# Patient Record
Sex: MALE | HISPANIC OR LATINO | Employment: UNEMPLOYED | URBAN - METROPOLITAN AREA
[De-identification: names, ages, dates, MRNs, and addresses within clinical notes are randomized per-mention and may not be internally consistent; named-entity substitution may affect disease eponyms.]

---

## 2024-06-16 ENCOUNTER — APPOINTMENT (OUTPATIENT)
Dept: RADIOLOGY | Facility: MEDICAL CENTER | Age: 69
End: 2024-06-16
Attending: EMERGENCY MEDICINE
Payer: MEDICARE

## 2024-06-16 ENCOUNTER — HOSPITAL ENCOUNTER (EMERGENCY)
Facility: MEDICAL CENTER | Age: 69
End: 2024-06-16
Attending: EMERGENCY MEDICINE
Payer: MEDICARE

## 2024-06-16 VITALS
HEART RATE: 86 BPM | WEIGHT: 171.08 LBS | BODY MASS INDEX: 30.31 KG/M2 | SYSTOLIC BLOOD PRESSURE: 138 MMHG | DIASTOLIC BLOOD PRESSURE: 65 MMHG | HEIGHT: 63 IN | TEMPERATURE: 98.2 F | OXYGEN SATURATION: 97 % | RESPIRATION RATE: 16 BRPM

## 2024-06-16 DIAGNOSIS — R32 URINARY INCONTINENCE, UNSPECIFIED TYPE: ICD-10-CM

## 2024-06-16 DIAGNOSIS — R00.0 SINUS TACHYCARDIA: ICD-10-CM

## 2024-06-16 DIAGNOSIS — R82.4 KETONURIA: ICD-10-CM

## 2024-06-16 DIAGNOSIS — N39.0 ACUTE UTI: Primary | ICD-10-CM

## 2024-06-16 DIAGNOSIS — E86.0 DEHYDRATION: ICD-10-CM

## 2024-06-16 DIAGNOSIS — R50.9 LOW GRADE FEVER: ICD-10-CM

## 2024-06-16 DIAGNOSIS — R30.0 DYSURIA: ICD-10-CM

## 2024-06-16 LAB
ALBUMIN SERPL BCP-MCNC: 4.3 G/DL (ref 3.2–4.9)
ALBUMIN/GLOB SERPL: 1.2 G/DL
ALP SERPL-CCNC: 115 U/L (ref 30–99)
ALT SERPL-CCNC: 42 U/L (ref 2–50)
AMORPH CRY #/AREA URNS HPF: PRESENT /HPF
ANION GAP SERPL CALC-SCNC: 15 MMOL/L (ref 7–16)
APPEARANCE UR: CLEAR
AST SERPL-CCNC: 55 U/L (ref 12–45)
BACTERIA #/AREA URNS HPF: ABNORMAL /HPF
BASOPHILS # BLD AUTO: 0.4 % (ref 0–1.8)
BASOPHILS # BLD: 0.02 K/UL (ref 0–0.12)
BILIRUB SERPL-MCNC: 1.7 MG/DL (ref 0.1–1.5)
BILIRUB UR QL STRIP.AUTO: NEGATIVE
BUN SERPL-MCNC: 18 MG/DL (ref 8–22)
CALCIUM ALBUM COR SERPL-MCNC: 8.9 MG/DL (ref 8.5–10.5)
CALCIUM SERPL-MCNC: 9.1 MG/DL (ref 8.5–10.5)
CHLORIDE SERPL-SCNC: 100 MMOL/L (ref 96–112)
CO2 SERPL-SCNC: 19 MMOL/L (ref 20–33)
COLOR UR: ABNORMAL
CREAT SERPL-MCNC: 0.71 MG/DL (ref 0.5–1.4)
EOSINOPHIL # BLD AUTO: 0 K/UL (ref 0–0.51)
EOSINOPHIL NFR BLD: 0 % (ref 0–6.9)
EPI CELLS #/AREA URNS HPF: ABNORMAL /HPF
ERYTHROCYTE [DISTWIDTH] IN BLOOD BY AUTOMATED COUNT: 44.5 FL (ref 35.9–50)
FLUAV RNA SPEC QL NAA+PROBE: NEGATIVE
FLUBV RNA SPEC QL NAA+PROBE: NEGATIVE
GFR SERPLBLD CREATININE-BSD FMLA CKD-EPI: 98 ML/MIN/1.73 M 2
GLOBULIN SER CALC-MCNC: 3.7 G/DL (ref 1.9–3.5)
GLUCOSE SERPL-MCNC: 118 MG/DL (ref 65–99)
GLUCOSE UR STRIP.AUTO-MCNC: NEGATIVE MG/DL
HCT VFR BLD AUTO: 43.7 % (ref 42–52)
HGB BLD-MCNC: 15 G/DL (ref 14–18)
HYALINE CASTS #/AREA URNS LPF: ABNORMAL /LPF
IMM GRANULOCYTES # BLD AUTO: 0.02 K/UL (ref 0–0.11)
IMM GRANULOCYTES NFR BLD AUTO: 0.4 % (ref 0–0.9)
KETONES UR STRIP.AUTO-MCNC: 80 MG/DL
LACTATE SERPL-SCNC: 1.4 MMOL/L (ref 0.5–2)
LEUKOCYTE ESTERASE UR QL STRIP.AUTO: ABNORMAL
LYMPHOCYTES # BLD AUTO: 0.52 K/UL (ref 1–4.8)
LYMPHOCYTES NFR BLD: 9.7 % (ref 22–41)
MCH RBC QN AUTO: 30.1 PG (ref 27–33)
MCHC RBC AUTO-ENTMCNC: 34.3 G/DL (ref 32.3–36.5)
MCV RBC AUTO: 87.8 FL (ref 81.4–97.8)
MICRO URNS: ABNORMAL
MONOCYTES # BLD AUTO: 0.37 K/UL (ref 0–0.85)
MONOCYTES NFR BLD AUTO: 6.9 % (ref 0–13.4)
MUCOUS THREADS #/AREA URNS HPF: ABNORMAL /HPF
NEUTROPHILS # BLD AUTO: 4.45 K/UL (ref 1.82–7.42)
NEUTROPHILS NFR BLD: 82.6 % (ref 44–72)
NITRITE UR QL STRIP.AUTO: NEGATIVE
NRBC # BLD AUTO: 0 K/UL
NRBC BLD-RTO: 0 /100 WBC (ref 0–0.2)
PH UR STRIP.AUTO: 5 [PH] (ref 5–8)
PLATELET # BLD AUTO: 172 K/UL (ref 164–446)
PMV BLD AUTO: 10.8 FL (ref 9–12.9)
POTASSIUM SERPL-SCNC: 3.8 MMOL/L (ref 3.6–5.5)
PROT SERPL-MCNC: 8 G/DL (ref 6–8.2)
PROT UR QL STRIP: 30 MG/DL
RBC # BLD AUTO: 4.98 M/UL (ref 4.7–6.1)
RBC # URNS HPF: ABNORMAL /HPF
RBC UR QL AUTO: ABNORMAL
RSV RNA SPEC QL NAA+PROBE: NEGATIVE
SARS-COV-2 RNA RESP QL NAA+PROBE: NOTDETECTED
SODIUM SERPL-SCNC: 134 MMOL/L (ref 135–145)
SP GR UR STRIP.AUTO: 1.03
UROBILINOGEN UR STRIP.AUTO-MCNC: 1 MG/DL
WBC # BLD AUTO: 5.4 K/UL (ref 4.8–10.8)
WBC #/AREA URNS HPF: ABNORMAL /HPF

## 2024-06-16 PROCEDURE — 81001 URINALYSIS AUTO W/SCOPE: CPT

## 2024-06-16 PROCEDURE — 96374 THER/PROPH/DIAG INJ IV PUSH: CPT

## 2024-06-16 PROCEDURE — 85025 COMPLETE CBC W/AUTO DIFF WBC: CPT

## 2024-06-16 PROCEDURE — 700105 HCHG RX REV CODE 258: Performed by: EMERGENCY MEDICINE

## 2024-06-16 PROCEDURE — 87040 BLOOD CULTURE FOR BACTERIA: CPT

## 2024-06-16 PROCEDURE — 83605 ASSAY OF LACTIC ACID: CPT

## 2024-06-16 PROCEDURE — 80053 COMPREHEN METABOLIC PANEL: CPT

## 2024-06-16 PROCEDURE — 87086 URINE CULTURE/COLONY COUNT: CPT

## 2024-06-16 PROCEDURE — 36415 COLL VENOUS BLD VENIPUNCTURE: CPT

## 2024-06-16 PROCEDURE — 700111 HCHG RX REV CODE 636 W/ 250 OVERRIDE (IP): Mod: JZ | Performed by: EMERGENCY MEDICINE

## 2024-06-16 PROCEDURE — 71045 X-RAY EXAM CHEST 1 VIEW: CPT

## 2024-06-16 PROCEDURE — 0241U HCHG SARS-COV-2 COVID-19 NFCT DS RESP RNA 4 TRGT ED POC: CPT

## 2024-06-16 PROCEDURE — 99285 EMERGENCY DEPT VISIT HI MDM: CPT

## 2024-06-16 RX ORDER — IBUPROFEN 800 MG/1
800 TABLET ORAL EVERY 8 HOURS PRN
Qty: 9 TABLET | Refills: 0 | Status: SHIPPED | OUTPATIENT
Start: 2024-06-16 | End: 2024-06-19

## 2024-06-16 RX ORDER — CEFTRIAXONE 1 G/1
1000 INJECTION, POWDER, FOR SOLUTION INTRAMUSCULAR; INTRAVENOUS ONCE
Status: COMPLETED | OUTPATIENT
Start: 2024-06-16 | End: 2024-06-16

## 2024-06-16 RX ORDER — ACETAMINOPHEN 500 MG
1000 TABLET ORAL EVERY 6 HOURS PRN
Qty: 20 TABLET | Refills: 0 | Status: SHIPPED | OUTPATIENT
Start: 2024-06-16 | End: 2024-06-20

## 2024-06-16 RX ORDER — SODIUM CHLORIDE 9 MG/ML
1000 INJECTION, SOLUTION INTRAVENOUS ONCE
Status: COMPLETED | OUTPATIENT
Start: 2024-06-16 | End: 2024-06-16

## 2024-06-16 RX ADMIN — SODIUM CHLORIDE 1000 ML: 9 INJECTION, SOLUTION INTRAVENOUS at 21:02

## 2024-06-16 RX ADMIN — CEFTRIAXONE SODIUM 1000 MG: 1 INJECTION, POWDER, FOR SOLUTION INTRAMUSCULAR; INTRAVENOUS at 21:20

## 2024-06-16 ASSESSMENT — PAIN DESCRIPTION - PAIN TYPE: TYPE: ACUTE PAIN

## 2024-06-17 NOTE — ED NOTES
Pt ambulated to [facility] from the lobby with a steady gait. Pt changed into a gown and placed on the monitor. Agree with triage note. Chart up for ERP. Urine sample collected and sent to lab.

## 2024-06-17 NOTE — DISCHARGE INSTRUCTIONS
Please drink plenty of fluids.  Continue taking antibiotic as directed.  You can follow-up with your primary care physician to check the urine culture which will tell you definitively what type of bacteria is causing his UTI.  But until then continue taking the antibiotic prescribed yesterday at urgent care.  If you have any worsening symptoms or concerns please return to the ED.  Thank you for coming in today.

## 2024-06-17 NOTE — ED NOTES
"Pt discharged home with family. IV discontinued and gauze placed, pt in possession of belongings. Pt provided discharge education and information pertaining to medications and follow up appointments. Pt received copy of discharge instructions and verbalized understanding. /65   Pulse 86   Temp 36.8 °C (98.2 °F) (Temporal)   Resp 16   Ht 1.6 m (5' 3\")   Wt 77.6 kg (171 lb 1.2 oz)   SpO2 97%   BMI 30.30 kg/m²     "

## 2024-06-17 NOTE — ED PROVIDER NOTES
ED Provider Note    Scribed for Esteban Barrientos by Sharmila Waggoner. 6/16/2024  7:51 PM    Primary care provider: Jovanni Simeon M.D.  Means of arrival: Walk-In  History obtained from: Patient  History limited by: None    CHIEF COMPLAINT  Chief Complaint   Patient presents with    Body Aches     Pt states for the past 2 days he has been feeling generalized malaise, body aches, and weakness.        EXTERNAL RECORDS REVIEWED  Inpatient Notes Patient seen in patient from 7/31/19-8/2/19 for a balance disorder.    HPI/ROS  LIMITATION TO HISTORY   Select: : None  OUTSIDE HISTORIAN(S):  Family daughter at bedside translates for the patient    HPI  Baldemar Mandujano is a 68 y.o. male who presents to the Emergency Department for evaluation of worsening body aches onset 2 days ago. Daughter at bedside states that the patient initially experienced urinary incontinence and painful urination, and was diagnosed with a UTI. He was started on Cipro two days ago at , but is still experiencing worsening lower back pain, fatigue, and generalized malaise. Daughter notes that he is also experiencing associated diarrhea, but has not fully lost control of his bowels. He denies any associated nausea, vomiting, or fever. He is otherwise healthy and takes no daily medications. He does not smoke cigarettes or drink alcohol daily. He has no history of IV drug use. The patient did have two falls Friday night getting into and out of the bath tub, but no other traumas or injuries. He also reports having an unsteady gait since retiring, but has never been seen for it.    REVIEW OF SYSTEMS  As above, all other systems reviewed and are negative.   See HPI for further details.     PAST MEDICAL HISTORY   Balance Disorder  SURGICAL HISTORY  patient denies any surgical history  SOCIAL HISTORY  Social History     Tobacco Use    Smoking status: Former    Smokeless tobacco: Never   Vaping Use    Vaping status: Never Used   Substance Use  "Topics    Alcohol use: Yes    Drug use: Never      Social History     Substance and Sexual Activity   Drug Use Never     FAMILY HISTORY  History reviewed. No pertinent family history.  CURRENT MEDICATIONS  Home Medications       Reviewed by Desirae Felix R.N. (Registered Nurse) on 06/16/24 at 1759  Med List Status: Not Addressed     Medication Last Dose Status   aspirin EC (ECOTRIN) 81 MG Tablet Delayed Response  Active   lisinopril (PRINIVIL) 10 MG Tab  Active        ALLERGIES  No Known Allergies    PHYSICAL EXAM    VITAL SIGNS:   Vitals:    06/16/24 1743 06/16/24 1746   BP: 132/66    Pulse: (!) 106    Resp: 16    Temp: 37.9 °C (100.2 °F)    TempSrc: Oral    SpO2: 96%    Weight:  77.6 kg (171 lb 1.2 oz)   Height:  1.6 m (5' 3\")       Vitals: My interpretation: normotensive, tachycardic, afebrile, not hypoxic    Reinterpretation of vitals: Improving    Cardiac Monitor Interpretation: The cardiac monitor revealed normal Sinus Rhythm with tachycardia as interpreted by me. The cardiac monitor was ordered secondary to the patient's history of tachycardia and to monitor for dysrhythmia and/or tachycardia.    PE:   Gen: sitting comfortably, speaking clearly, appears in no acute distress   ENT: Mucous membranes moist, posterior pharynx clear, uvula midline, nares patent bilaterally   Neck: Supple, FROM  Pulmonary: Lungs are clear to auscultation bilaterally. No tachypnea  CV:  RRR, no murmur appreciated, pulses 2+ in both upper and lower extremities  Abdomen: soft, NT/ND; no rebound/guarding  : no CVA or suprapubic tenderness   Neuro: A&Ox4 (person, place, time, situation), speech fluent, gait steady, no focal deficits appreciated  Skin: No rash or lesions.  No pallor or jaundice.  No cyanosis.  Warm and dry.     DIAGNOSTIC STUDIES / PROCEDURES    LABS  Results for orders placed or performed during the hospital encounter of 06/16/24   URINALYSIS CULTURE, IF INDICATED    Specimen: Urine, Clean Catch   Result Value " Ref Range    Color DK Yellow     Character Clear     Specific Gravity 1.026 <1.035    Ph 5.0 5.0 - 8.0    Glucose Negative Negative mg/dL    Ketones 80 (A) Negative mg/dL    Protein 30 (A) Negative mg/dL    Bilirubin Negative Negative    Urobilinogen, Urine 1.0 Negative    Nitrite Negative Negative    Leukocyte Esterase Trace (A) Negative    Occult Blood Moderate (A) Negative    Micro Urine Req Microscopic    Lactic Acid   Result Value Ref Range    Lactic Acid 1.4 0.5 - 2.0 mmol/L   CBC with Differential   Result Value Ref Range    WBC 5.4 4.8 - 10.8 K/uL    RBC 4.98 4.70 - 6.10 M/uL    Hemoglobin 15.0 14.0 - 18.0 g/dL    Hematocrit 43.7 42.0 - 52.0 %    MCV 87.8 81.4 - 97.8 fL    MCH 30.1 27.0 - 33.0 pg    MCHC 34.3 32.3 - 36.5 g/dL    RDW 44.5 35.9 - 50.0 fL    Platelet Count 172 164 - 446 K/uL    MPV 10.8 9.0 - 12.9 fL    Neutrophils-Polys 82.60 (H) 44.00 - 72.00 %    Lymphocytes 9.70 (L) 22.00 - 41.00 %    Monocytes 6.90 0.00 - 13.40 %    Eosinophils 0.00 0.00 - 6.90 %    Basophils 0.40 0.00 - 1.80 %    Immature Granulocytes 0.40 0.00 - 0.90 %    Nucleated RBC 0.00 0.00 - 0.20 /100 WBC    Neutrophils (Absolute) 4.45 1.82 - 7.42 K/uL    Lymphs (Absolute) 0.52 (L) 1.00 - 4.80 K/uL    Monos (Absolute) 0.37 0.00 - 0.85 K/uL    Eos (Absolute) 0.00 0.00 - 0.51 K/uL    Baso (Absolute) 0.02 0.00 - 0.12 K/uL    Immature Granulocytes (abs) 0.02 0.00 - 0.11 K/uL    NRBC (Absolute) 0.00 K/uL   Complete Metabolic Panel   Result Value Ref Range    Sodium 134 (L) 135 - 145 mmol/L    Potassium 3.8 3.6 - 5.5 mmol/L    Chloride 100 96 - 112 mmol/L    Co2 19 (L) 20 - 33 mmol/L    Anion Gap 15.0 7.0 - 16.0    Glucose 118 (H) 65 - 99 mg/dL    Bun 18 8 - 22 mg/dL    Creatinine 0.71 0.50 - 1.40 mg/dL    Calcium 9.1 8.5 - 10.5 mg/dL    Correct Calcium 8.9 8.5 - 10.5 mg/dL    AST(SGOT) 55 (H) 12 - 45 U/L    ALT(SGPT) 42 2 - 50 U/L    Alkaline Phosphatase 115 (H) 30 - 99 U/L    Total Bilirubin 1.7 (H) 0.1 - 1.5 mg/dL    Albumin 4.3 3.2 -  4.9 g/dL    Total Protein 8.0 6.0 - 8.2 g/dL    Globulin 3.7 (H) 1.9 - 3.5 g/dL    A-G Ratio 1.2 g/dL   URINE MICROSCOPIC (W/UA)   Result Value Ref Range    WBC 5-10 (A) /hpf    RBC 0-2 (A) /hpf    Bacteria Few (A) None /hpf    Epithelial Cells Few /hpf    Mucous Threads Many /hpf    Amorphous Crystal Present /hpf    Hyaline Cast 0-2 /lpf   ESTIMATED GFR   Result Value Ref Range    GFR (CKD-EPI) 98 >60 mL/min/1.73 m 2   POC CoV-2, FLU A/B, RSV by PCR   Result Value Ref Range    POC Influenza A RNA, PCR Negative Negative    POC Influenza B RNA, PCR Negative Negative    POC RSV, by PCR Negative Negative    POC SARS-CoV-2, PCR NotDetected       All labs reviewed by me. Labs were compared to prior labs if they were available. Significant for no leukocytosis, no anemia, normal electrolytes, normal glucose, normal renal function, normal liver enzymes, mildly elevated bilirubin, urinalysis contaminated but suggestive of possible infection, lactic acid normal, flu, COVID, RSV negative.    RADIOLOGY  I have independently interpreted the diagnostic imaging associated with this visit and am waiting the final reading from the radiologist.   My preliminary interpretation is a follows: On my independent interpretation patient has no new significant cardiomegaly or focal consolidative process.     Radiologist interpretation is as follows:  DX-CHEST-PORTABLE (1 VIEW)   Final Result         1.  No acute cardiopulmonary disease.          COURSE & MEDICAL DECISION MAKING  Nursing notes, VS, PMSFHx, labs, imaging, EKG reviewed in chart.    Ddx: UTI, Pyelonephritis, Sepsis, Influenza, Viral syndrome, COVID, RSV, cauda equina    MDM: 7:51 PM Baldemar Mandujano is a 68 y.o. male who presented with evaluation of dysuria and urinary incontinence for the past 2 days.  He has been on antibiotics since yesterday with ciprofloxacin prescribed by urgent care after positive UA for UTI.  No history of any medical issues.  No loss of bowel  continence.  Has had some mild diarrhea.  He is healthy, takes no medications on a regular basis.  Denies any other symptoms such as fever, nausea, vomiting, abdominal pain or back pain.  He has some generalized weakness he states.  He has had difficulty with ambulation since 3 years ago, but no changes in this.  Daughter thinks that he is fine but mother was very concerned and wanted him checked out in the emergency department today as his symptoms and not gotten better, despite the fact that he only been taking antibiotics for essentially 1 day.  Upon arrival here patient's vital signs are normal other than low-grade tachycardia at 106.  His physical exam and neurological exam is benign.  No tenderness over the bladder, no CVA tenderness.  Considering his wife's wishes, we will run sepsis protocol and the patient including chest x-ray, COVID flu and RSV, urinalysis and blood work.  Chest x-ray on my independent interpretation shows no focal consolidation or cardiomegaly, radiologist agrees. All labs reviewed by me. Labs were compared to prior labs if they were available. Significant for no leukocytosis, no anemia, normal electrolytes, normal glucose, normal renal function, normal liver enzymes, mildly elevated bilirubin, urinalysis contaminated but suggestive of possible infection, lactic acid normal, flu, COVID, RSV negative.  Patient received IV fluids for ketonuria and bicarb of 19 demonstrating mild dehydration.  He is given a dose of ceftriaxone for hopefully definitive treatment of UTI but will have him continue taking the ciprofloxacin until urine culture returns.  Overall patient seems to be back to baseline and his vital signs are normal.  I do recommend follow-up closely with his PCP but at this time he does not meet criteria for inpatient admission although was considered.  He is ambulatory and well-appearing time of discharge and asking for discharge home.  Recommend Tylenol Motrin and continue  ciprofloxacin.  Patient verbalized understanding and is amenable.    HYDRATION: Based on the patient's presentation of Acute Vomiting, Dehydration and Inability to take oral fluids the patient was given IV fluids. IV Hydration was used because oral hydration was not adequate alone. Upon recheck following hydration, the patient was improved.    ADDITIONAL PROBLEM LIST AND DISPOSITION    I have discussed management of the patient with the following physicians and NESSA's:  None    Discussion of management with other \[facility]\"" or appropriate source(s): None     Escalation of care considered, and ultimately not performed:acute inpatient care management, however at this time, the patient is most appropriate for outpatient management    Barriers to care at this time, including but not limited to:  None .     Decision tools and prescription drugs considered including, but not limited to:  Tylenol, Motrin, ciprofloxacin .    FINAL IMPRESSION  1. Acute UTI Acute   2. Dysuria Acute   3. Urinary incontinence, unspecified type Acute   4. Sinus tachycardia Acute   5. Dehydration Acute   6. Ketonuria Acute   7. Low grade fever Acute      Sharmila GARCIA (Nicloe), am scribing for, and in the presence of, Esteban Barrientos.    Electronically signed by: Sharmila Waggoner (Nicole), 6/16/2024    IEsteban personally performed the services described in this documentation, as scribed by Sharmila Waggoner in my presence, and it is both accurate and complete.    The note accurately reflects work and decisions made by me.  Esteban Barrientos  6/16/2024  8:22 PM

## 2024-06-17 NOTE — ED TRIAGE NOTES
Chief Complaint   Patient presents with    Body Aches     Pt states for the past 2 days he has been feeling generalized malaise, body aches, and weakness.      Pt ambulatory to triage for above complaint. Per family pt had been to  to be seen for urinary frequency and incontinence and he was diagnosed with UTI 2 days ago, pt has been taking antibiotics but since going to  pt has been weak and not wanting to do activities like usual. Pt family concerned      Pt is alert/oriented and follows commands. Pt speaking in full sentences and responds appropriately to questions. No acute distress noted in triage and respirations are even and unlabored.     Pt placed in lobby and educated on triage process. Pt encouraged to alert staff for any changes in condition.

## 2024-06-18 LAB
BACTERIA UR CULT: NORMAL
SIGNIFICANT IND 70042: NORMAL
SITE SITE: NORMAL
SOURCE SOURCE: NORMAL

## 2024-06-21 LAB
BACTERIA BLD CULT: NORMAL
BACTERIA BLD CULT: NORMAL
SIGNIFICANT IND 70042: NORMAL
SIGNIFICANT IND 70042: NORMAL
SITE SITE: NORMAL
SITE SITE: NORMAL
SOURCE SOURCE: NORMAL
SOURCE SOURCE: NORMAL